# Patient Record
Sex: MALE | Race: OTHER | HISPANIC OR LATINO
[De-identification: names, ages, dates, MRNs, and addresses within clinical notes are randomized per-mention and may not be internally consistent; named-entity substitution may affect disease eponyms.]

---

## 2017-08-16 ENCOUNTER — TRANSCRIPTION ENCOUNTER (OUTPATIENT)
Age: 22
End: 2017-08-16

## 2017-08-18 ENCOUNTER — TRANSCRIPTION ENCOUNTER (OUTPATIENT)
Age: 22
End: 2017-08-18

## 2017-09-17 ENCOUNTER — EMERGENCY (EMERGENCY)
Facility: HOSPITAL | Age: 22
LOS: 1 days | Discharge: LEFT BEFORE DISCHARGE | End: 2017-09-17
Attending: EMERGENCY MEDICINE

## 2017-09-17 VITALS
WEIGHT: 182.1 LBS | OXYGEN SATURATION: 99 % | HEIGHT: 67 IN | RESPIRATION RATE: 18 BRPM | SYSTOLIC BLOOD PRESSURE: 139 MMHG | TEMPERATURE: 98 F | HEART RATE: 96 BPM | DIASTOLIC BLOOD PRESSURE: 80 MMHG

## 2017-09-17 DIAGNOSIS — R10.9 UNSPECIFIED ABDOMINAL PAIN: ICD-10-CM

## 2017-09-17 DIAGNOSIS — Z53.21 PROCEDURE AND TREATMENT NOT CARRIED OUT DUE TO PATIENT LEAVING PRIOR TO BEING SEEN BY HEALTH CARE PROVIDER: ICD-10-CM

## 2017-12-26 ENCOUNTER — TRANSCRIPTION ENCOUNTER (OUTPATIENT)
Age: 22
End: 2017-12-26

## 2017-12-29 ENCOUNTER — EMERGENCY (EMERGENCY)
Facility: HOSPITAL | Age: 22
LOS: 1 days | Discharge: HOME | End: 2017-12-29
Attending: EMERGENCY MEDICINE
Payer: SELF-PAY

## 2017-12-29 VITALS
DIASTOLIC BLOOD PRESSURE: 60 MMHG | OXYGEN SATURATION: 100 % | HEART RATE: 60 BPM | RESPIRATION RATE: 16 BRPM | TEMPERATURE: 97 F | SYSTOLIC BLOOD PRESSURE: 125 MMHG

## 2017-12-29 VITALS
HEART RATE: 60 BPM | SYSTOLIC BLOOD PRESSURE: 120 MMHG | OXYGEN SATURATION: 97 % | DIASTOLIC BLOOD PRESSURE: 67 MMHG | TEMPERATURE: 98 F | HEIGHT: 68 IN | RESPIRATION RATE: 16 BRPM | WEIGHT: 179.9 LBS

## 2017-12-29 PROCEDURE — 99283 EMERGENCY DEPT VISIT LOW MDM: CPT

## 2017-12-29 NOTE — ED ADULT TRIAGE NOTE - CHIEF COMPLAINT QUOTE
C/o " rash to stomach, chest, arms x 2 month. I went to the doctor and he said it was ringworm". UTD with childhood vaccines

## 2017-12-29 NOTE — ED PROVIDER NOTE - ATTENDING CONTRIBUTION TO CARE
23 y/o M presents with c/o worsening scaly rash to back, abd, and forearms x 2 weeks despite taking cream rx'd for fungal infection by walk in clinic x 4days. PE reveals diffuse circular scales on anterior and posterior trunk. No evidence of cellulitis. Tx with clotrimazole and f/u with derm as outpt.

## 2017-12-29 NOTE — ED PROVIDER NOTE - OBJECTIVE STATEMENT
21 y/o M pt with no significant PMHx presents to ED c/o worsening scaly rash to back, abd, and forearms x 2 weeks; rash on L arm x 2 months. Pt reports that he was seen by a walk-in clinic 4 days ago and was prescribed cream for fungal infection. Pt states that his rash has since spread. Pt denies taking any prednisone or steroids for symptom. 21 y/o M pt with no significant PMHx presents to ED c/o worsening scaly rash to back, abd, and forearms x 2 weeks; rash on L arm x 2 months. Pt reports that he was seen by a walk-in clinic 4 days ago and was prescribed cream for fungal infection. Pt states that his rash has since spread. Pt denies taking any prednisone or steroids for symptom. Pt denies h/o psoriasis, fever, chills, rash to groin, or any other complaints. NKDA.

## 2017-12-29 NOTE — ED PROVIDER NOTE - MEDICAL DECISION MAKING DETAILS
Pt is well appearing and vss c/o worsening diffused rash; symptoms suggestive of corporis. Pt comfirms going to gym, stating that he does not always shower and dry off afterwards. Will Rx body wash and instruct pt to always shower and dry off. Will instruct pt to f/u with dermatologist. Pt denies itchiness; no sign of infection present. Pt is well appearing and vss c/o worsening diffused rash; symptoms suggestive of tinea corporis. Pt confirms going to gym, stating that he does not always shower and dry off afterwards. Will Rx body wash and instruct pt to always shower and dry off. No signs infection. Will instruct pt to f/u with dermatologist. Pt denies itchiness; no sign of infection present.

## 2017-12-29 NOTE — ED PROVIDER NOTE - SKIN, MLM
diffused scales, circular scaly lesions on back and abd, no erythema, no induration, no fluctuance, no streaking, no heat.

## 2018-05-11 ENCOUNTER — EMERGENCY (EMERGENCY)
Facility: HOSPITAL | Age: 23
LOS: 1 days | Discharge: HOME | End: 2018-05-11
Attending: EMERGENCY MEDICINE
Payer: SELF-PAY

## 2018-05-11 VITALS
HEART RATE: 86 BPM | OXYGEN SATURATION: 99 % | DIASTOLIC BLOOD PRESSURE: 75 MMHG | TEMPERATURE: 99 F | SYSTOLIC BLOOD PRESSURE: 103 MMHG | RESPIRATION RATE: 17 BRPM | HEIGHT: 68 IN | WEIGHT: 184.97 LBS

## 2018-05-11 LAB
ACETONE SERPL-MCNC: NEGATIVE — SIGNIFICANT CHANGE UP
ALBUMIN SERPL ELPH-MCNC: 3.7 G/DL — SIGNIFICANT CHANGE UP (ref 3.5–5)
ALP SERPL-CCNC: 83 U/L — SIGNIFICANT CHANGE UP (ref 40–120)
ALT FLD-CCNC: 29 U/L DA — SIGNIFICANT CHANGE UP (ref 10–60)
ANION GAP SERPL CALC-SCNC: 7 MMOL/L — SIGNIFICANT CHANGE UP (ref 5–17)
APPEARANCE UR: CLEAR — SIGNIFICANT CHANGE UP
APTT BLD: 31.9 SEC — SIGNIFICANT CHANGE UP (ref 27.5–37.4)
AST SERPL-CCNC: 32 U/L — SIGNIFICANT CHANGE UP (ref 10–40)
BACTERIA # UR AUTO: ABNORMAL /HPF
BASOPHILS # BLD AUTO: 0 K/UL — SIGNIFICANT CHANGE UP (ref 0–0.2)
BASOPHILS NFR BLD AUTO: 1 % — SIGNIFICANT CHANGE UP (ref 0–2)
BILIRUB SERPL-MCNC: 0.5 MG/DL — SIGNIFICANT CHANGE UP (ref 0.2–1.2)
BILIRUB UR-MCNC: NEGATIVE — SIGNIFICANT CHANGE UP
BUN SERPL-MCNC: 15 MG/DL — SIGNIFICANT CHANGE UP (ref 7–18)
CALCIUM SERPL-MCNC: 8.7 MG/DL — SIGNIFICANT CHANGE UP (ref 8.4–10.5)
CHLORIDE SERPL-SCNC: 109 MMOL/L — HIGH (ref 96–108)
CO2 SERPL-SCNC: 26 MMOL/L — SIGNIFICANT CHANGE UP (ref 22–31)
COLOR SPEC: YELLOW — SIGNIFICANT CHANGE UP
CREAT SERPL-MCNC: 0.91 MG/DL — SIGNIFICANT CHANGE UP (ref 0.5–1.3)
DIFF PNL FLD: ABNORMAL
EOSINOPHIL # BLD AUTO: 0.1 K/UL — SIGNIFICANT CHANGE UP (ref 0–0.5)
EOSINOPHIL NFR BLD AUTO: 2.4 % — SIGNIFICANT CHANGE UP (ref 0–6)
ERYTHROCYTE [SEDIMENTATION RATE] IN BLOOD: 23 MM/HR — HIGH (ref 0–15)
GLUCOSE SERPL-MCNC: 121 MG/DL — HIGH (ref 70–99)
GLUCOSE UR QL: NEGATIVE — SIGNIFICANT CHANGE UP
HCT VFR BLD CALC: 46.8 % — SIGNIFICANT CHANGE UP (ref 39–50)
HGB BLD-MCNC: 15.8 G/DL — SIGNIFICANT CHANGE UP (ref 13–17)
INR BLD: 1.16 RATIO — SIGNIFICANT CHANGE UP (ref 0.88–1.16)
KETONES UR-MCNC: NEGATIVE — SIGNIFICANT CHANGE UP
LACTATE SERPL-SCNC: 1.2 MMOL/L — SIGNIFICANT CHANGE UP (ref 0.7–2)
LEUKOCYTE ESTERASE UR-ACNC: NEGATIVE — SIGNIFICANT CHANGE UP
LYMPHOCYTES # BLD AUTO: 1.2 K/UL — SIGNIFICANT CHANGE UP (ref 1–3.3)
LYMPHOCYTES # BLD AUTO: 28.5 % — SIGNIFICANT CHANGE UP (ref 13–44)
MAGNESIUM SERPL-MCNC: 1.9 MG/DL — SIGNIFICANT CHANGE UP (ref 1.6–2.6)
MCHC RBC-ENTMCNC: 30.6 PG — SIGNIFICANT CHANGE UP (ref 27–34)
MCHC RBC-ENTMCNC: 33.8 GM/DL — SIGNIFICANT CHANGE UP (ref 32–36)
MCV RBC AUTO: 90.5 FL — SIGNIFICANT CHANGE UP (ref 80–100)
MONOCYTES # BLD AUTO: 0.4 K/UL — SIGNIFICANT CHANGE UP (ref 0–0.9)
MONOCYTES NFR BLD AUTO: 10.1 % — SIGNIFICANT CHANGE UP (ref 2–14)
NEUTROPHILS # BLD AUTO: 2.3 K/UL — SIGNIFICANT CHANGE UP (ref 1.8–7.4)
NEUTROPHILS NFR BLD AUTO: 58 % — SIGNIFICANT CHANGE UP (ref 43–77)
NITRITE UR-MCNC: NEGATIVE — SIGNIFICANT CHANGE UP
PH UR: 5 — SIGNIFICANT CHANGE UP (ref 5–8)
PLATELET # BLD AUTO: 152 K/UL — SIGNIFICANT CHANGE UP (ref 150–400)
POTASSIUM SERPL-MCNC: 3.4 MMOL/L — LOW (ref 3.5–5.3)
POTASSIUM SERPL-SCNC: 3.4 MMOL/L — LOW (ref 3.5–5.3)
PROT SERPL-MCNC: 7.7 G/DL — SIGNIFICANT CHANGE UP (ref 6–8.3)
PROT UR-MCNC: NEGATIVE — SIGNIFICANT CHANGE UP
PROTHROM AB SERPL-ACNC: 12.7 SEC — SIGNIFICANT CHANGE UP (ref 9.8–12.7)
RAPID RVP RESULT: SIGNIFICANT CHANGE UP
RBC # BLD: 5.17 M/UL — SIGNIFICANT CHANGE UP (ref 4.2–5.8)
RBC # FLD: 10.6 % — SIGNIFICANT CHANGE UP (ref 10.3–14.5)
RBC CASTS # UR COMP ASSIST: SIGNIFICANT CHANGE UP /HPF (ref 0–2)
SODIUM SERPL-SCNC: 142 MMOL/L — SIGNIFICANT CHANGE UP (ref 135–145)
SP GR SPEC: 1.02 — SIGNIFICANT CHANGE UP (ref 1.01–1.02)
UROBILINOGEN FLD QL: 1
WBC # BLD: 4 K/UL — SIGNIFICANT CHANGE UP (ref 3.8–10.5)
WBC # FLD AUTO: 4 K/UL — SIGNIFICANT CHANGE UP (ref 3.8–10.5)
WBC UR QL: SIGNIFICANT CHANGE UP /HPF (ref 0–5)

## 2018-05-11 PROCEDURE — 71046 X-RAY EXAM CHEST 2 VIEWS: CPT

## 2018-05-11 PROCEDURE — 83605 ASSAY OF LACTIC ACID: CPT

## 2018-05-11 PROCEDURE — 87798 DETECT AGENT NOS DNA AMP: CPT

## 2018-05-11 PROCEDURE — 85652 RBC SED RATE AUTOMATED: CPT

## 2018-05-11 PROCEDURE — 87086 URINE CULTURE/COLONY COUNT: CPT

## 2018-05-11 PROCEDURE — 87040 BLOOD CULTURE FOR BACTERIA: CPT

## 2018-05-11 PROCEDURE — 87633 RESP VIRUS 12-25 TARGETS: CPT

## 2018-05-11 PROCEDURE — 87581 M.PNEUMON DNA AMP PROBE: CPT

## 2018-05-11 PROCEDURE — 83735 ASSAY OF MAGNESIUM: CPT

## 2018-05-11 PROCEDURE — 80053 COMPREHEN METABOLIC PANEL: CPT

## 2018-05-11 PROCEDURE — 87486 CHLMYD PNEUM DNA AMP PROBE: CPT

## 2018-05-11 PROCEDURE — 85027 COMPLETE CBC AUTOMATED: CPT

## 2018-05-11 PROCEDURE — 99284 EMERGENCY DEPT VISIT MOD MDM: CPT | Mod: 25

## 2018-05-11 PROCEDURE — 81001 URINALYSIS AUTO W/SCOPE: CPT

## 2018-05-11 PROCEDURE — 82009 KETONE BODYS QUAL: CPT

## 2018-05-11 PROCEDURE — 85730 THROMBOPLASTIN TIME PARTIAL: CPT

## 2018-05-11 PROCEDURE — 85610 PROTHROMBIN TIME: CPT

## 2018-05-11 PROCEDURE — 71046 X-RAY EXAM CHEST 2 VIEWS: CPT | Mod: 26

## 2018-05-11 PROCEDURE — 99284 EMERGENCY DEPT VISIT MOD MDM: CPT

## 2018-05-11 RX ORDER — SODIUM CHLORIDE 9 MG/ML
1000 INJECTION INTRAMUSCULAR; INTRAVENOUS; SUBCUTANEOUS ONCE
Qty: 0 | Refills: 0 | Status: DISCONTINUED | OUTPATIENT
Start: 2018-05-11 | End: 2018-05-15

## 2018-05-11 RX ORDER — SODIUM CHLORIDE 9 MG/ML
1000 INJECTION INTRAMUSCULAR; INTRAVENOUS; SUBCUTANEOUS
Qty: 0 | Refills: 0 | Status: DISCONTINUED | OUTPATIENT
Start: 2018-05-11 | End: 2018-05-15

## 2018-05-11 RX ORDER — POTASSIUM CHLORIDE 20 MEQ
40 PACKET (EA) ORAL ONCE
Qty: 0 | Refills: 0 | Status: DISCONTINUED | OUTPATIENT
Start: 2018-05-11 | End: 2018-05-15

## 2018-05-11 NOTE — ED PROVIDER NOTE - CONDUCTED A DETAILED DISCUSSION WITH PATIENT AND/OR GUARDIAN REGARDING, MDM
need for outpatient follow-up/lab results need for outpatient follow-up/radiology results/lab results

## 2018-05-11 NOTE — ED PROVIDER NOTE - PROGRESS NOTE DETAILS
Pt feeling much better, no leukocytosis, no source of infection, will d/c home, advised to f/u with PMD

## 2018-05-11 NOTE — ED PROVIDER NOTE - ENMT, MLM
Airway patent, Nasal mucosa clear. Mouth with normal mucosa. Throat has no vesicles, no oropharyngeal exudates and uvula is midline. sinus- NT to percussion

## 2018-05-11 NOTE — ED PROVIDER NOTE - MEDICAL DECISION MAKING DETAILS
21 y/o M pt w/ fever x7 days. No source of infection, mostly viral. Will get labs and RVP and reassess.

## 2018-05-11 NOTE — ED PROVIDER NOTE - OBJECTIVE STATEMENT
21 y/o M pt w/ no significant PMHx and no significant PSHx presents to ED c/o fever for x7 days w/ intermittent chills, body aches throughout entire body, HA at back of head, generalized  weakness, dizziness, and nausea. Pt states that he took Excedrin at 2 o'clock x5.5 hours ago w/ little relief. Pt admits to smoking marijuana very rarely. Pt denies smoking cigarettes, recent travel, or any recent contacts. Pt also denies cough, sneezing, dysuria, sore throat, vomiting, or any other complaints. NKDA. No PMD.

## 2018-05-12 ENCOUNTER — EMERGENCY (EMERGENCY)
Facility: HOSPITAL | Age: 23
LOS: 1 days | Discharge: HOME | End: 2018-05-12
Attending: EMERGENCY MEDICINE
Payer: SELF-PAY

## 2018-05-12 VITALS
HEART RATE: 96 BPM | DIASTOLIC BLOOD PRESSURE: 78 MMHG | SYSTOLIC BLOOD PRESSURE: 116 MMHG | OXYGEN SATURATION: 95 % | TEMPERATURE: 99 F | RESPIRATION RATE: 16 BRPM

## 2018-05-12 PROCEDURE — 99283 EMERGENCY DEPT VISIT LOW MDM: CPT

## 2018-05-12 PROCEDURE — 99284 EMERGENCY DEPT VISIT MOD MDM: CPT

## 2018-05-12 RX ORDER — ACETAMINOPHEN 500 MG
650 TABLET ORAL ONCE
Qty: 0 | Refills: 0 | Status: COMPLETED | OUTPATIENT
Start: 2018-05-12 | End: 2018-05-12

## 2018-05-12 RX ORDER — ONDANSETRON 8 MG/1
1 TABLET, FILM COATED ORAL
Qty: 28 | Refills: 0 | OUTPATIENT
Start: 2018-05-12 | End: 2018-05-18

## 2018-05-12 RX ORDER — ONDANSETRON 8 MG/1
8 TABLET, FILM COATED ORAL ONCE
Qty: 0 | Refills: 0 | Status: COMPLETED | OUTPATIENT
Start: 2018-05-12 | End: 2018-05-12

## 2018-05-12 RX ORDER — IBUPROFEN 200 MG
600 TABLET ORAL ONCE
Qty: 0 | Refills: 0 | Status: COMPLETED | OUTPATIENT
Start: 2018-05-12 | End: 2018-05-12
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
## 2018-05-12 NOTE — ED PROVIDER NOTE - CONDUCTED A DETAILED DISCUSSION WITH PATIENT AND/OR GUARDIAN REGARDING, MDM
need for outpatient follow-up lab results/radiology results/return to ED if symptoms worsen, persist or questions arise/need for outpatient follow-up

## 2018-05-12 NOTE — ED ADULT NURSE NOTE - OBJECTIVE STATEMENT
pt is a 23 y/o male c/o vomiting  /bodyaches states he has headache  and nausea since today reports was seen here  yesterday for the same  symptoms and was discharge home after feeling better.

## 2018-05-12 NOTE — ED ADULT NURSE NOTE - NSELOPED_ED_ALL_ED
Call was placed to patient's given phone number to arrange for patient to  instructions and/or prescription.

## 2018-05-12 NOTE — ED PROVIDER NOTE - OBJECTIVE STATEMENT
23 y/o M pt with no PMHx and no PSHx presents to ED c/o general (diffuse) body aches, HA, and nausea since today. Pt reports experiencing similar sx's yesterday; pt was seen in [facility] ED by Dr. Millan at the time and was diagnosed with a viral illness. Pt states undergoing a CXR, blood work, and RVP, all of which were normal, and pt was ultimately d/c home after feeling improved, however pt's sx's returned today. Pt denies abdominal pain, vomiting, diarrhea, difficulty urinating, or any other complaints. NKDA. 21 y/o M pt with no PMHx and no PSHx presents to ED c/o general (diffuse) body aches, HA, and nausea since today. Pt reports experiencing similar sx's yesterday; pt was seen in [facility] ED at the time and was diagnosed with a viral illness. Pt had CXR, blood work, and RVP, ua which were all of which were consistent with viral illness, and pt was ultimately d/c home after feeling improved, however pt's sx's returned today. Pt denies abdominal pain, vomiting, diarrhea, difficulty urinating, or any other complaints. NKDA.

## 2018-05-13 LAB
CULTURE RESULTS: NO GROWTH — SIGNIFICANT CHANGE UP
SPECIMEN SOURCE: SIGNIFICANT CHANGE UP

## 2018-05-17 LAB
CULTURE RESULTS: SIGNIFICANT CHANGE UP
CULTURE RESULTS: SIGNIFICANT CHANGE UP
SPECIMEN SOURCE: SIGNIFICANT CHANGE UP
SPECIMEN SOURCE: SIGNIFICANT CHANGE UP

## 2018-12-07 NOTE — ED PROVIDER NOTE - NS HIV RISK FACTOR YES
Patient scheduled for CPE with DR. Zheng on 12/20/18.   
Patient would like to have his yearly check up with Dr. Aceves. He knows it is time for labs. He usually has it done somewhere else. He knows Dr. Aceves is off until 01-17-19.  He is wondering if he could be seen by someone else until he comes back? Please call him back.  
Declined

## 2019-01-15 ENCOUNTER — EMERGENCY (EMERGENCY)
Facility: HOSPITAL | Age: 24
LOS: 1 days | Discharge: HOME | End: 2019-01-15
Attending: EMERGENCY MEDICINE
Payer: SELF-PAY

## 2019-01-15 VITALS
OXYGEN SATURATION: 98 % | TEMPERATURE: 98 F | SYSTOLIC BLOOD PRESSURE: 115 MMHG | RESPIRATION RATE: 70 BRPM | WEIGHT: 199.96 LBS | DIASTOLIC BLOOD PRESSURE: 71 MMHG | HEIGHT: 68 IN

## 2019-01-15 PROCEDURE — 99284 EMERGENCY DEPT VISIT MOD MDM: CPT

## 2019-01-15 PROCEDURE — 99053 MED SERV 10PM-8AM 24 HR FAC: CPT
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
## 2019-01-16 VITALS
DIASTOLIC BLOOD PRESSURE: 73 MMHG | TEMPERATURE: 98 F | RESPIRATION RATE: 16 BRPM | SYSTOLIC BLOOD PRESSURE: 119 MMHG | HEART RATE: 74 BPM | OXYGEN SATURATION: 99 %

## 2019-01-16 PROCEDURE — 99284 EMERGENCY DEPT VISIT MOD MDM: CPT | Mod: 25

## 2019-01-16 PROCEDURE — 70480 CT ORBIT/EAR/FOSSA W/O DYE: CPT

## 2019-01-16 PROCEDURE — 70480 CT ORBIT/EAR/FOSSA W/O DYE: CPT | Mod: 26

## 2019-01-16 RX ORDER — OXYCODONE AND ACETAMINOPHEN 5; 325 MG/1; MG/1
1 TABLET ORAL ONCE
Qty: 0 | Refills: 0 | Status: DISCONTINUED | OUTPATIENT
Start: 2019-01-16 | End: 2019-01-16

## 2019-01-16 RX ORDER — POLYMYXIN B SULF/TRIMETHOPRIM 10000-1/ML
1 DROPS OPHTHALMIC (EYE) ONCE
Qty: 0 | Refills: 0 | Status: COMPLETED | OUTPATIENT
Start: 2019-01-16 | End: 2019-01-16

## 2019-01-16 RX ORDER — IBUPROFEN 200 MG
1 TABLET ORAL
Qty: 20 | Refills: 0 | OUTPATIENT
Start: 2019-01-16 | End: 2019-01-20

## 2019-01-16 RX ADMIN — Medication 1 DROP(S): at 02:09

## 2019-01-16 RX ADMIN — OXYCODONE AND ACETAMINOPHEN 1 TABLET(S): 5; 325 TABLET ORAL at 01:45

## 2019-01-16 RX ADMIN — Medication 1 DROP(S): at 04:24

## 2019-01-16 NOTE — ED PROVIDER NOTE - NSFOLLOWUPINSTRUCTIONS_ED_ALL_ED_FT
Followup at Eye clinic today at 11am. Call [phone] for directions.  Followup with Ear/Nose/Throat specialist next week-Call Dr. Garcia above for appointment.    Apply 1 antibiotic drop every 3 hours for 7 days.  For pain continue medications as prescribed.

## 2019-01-16 NOTE — ED PROVIDER NOTE - CARE PROVIDER_API CALL
Rafy Garcia), Otolaryngology  [address] [place] [address]  Phone: [phone]  Fax: [phone]
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
## 2019-01-16 NOTE — ED PROVIDER NOTE - NSFOLLOWUPCLINICS_GEN_ALL_ED_FT
[facility]  Ophthalmology  [address] [place] [address] [address]  [place] [address]  Phone: [phone]  Fax:   Follow Up Time:

## 2019-01-16 NOTE — ED PROVIDER NOTE - PROGRESS NOTE DETAILS
CT shows L medial wall fracture  Spoke to Dr. Zhang CT shows L medial wall fracture  Spoke to Dr. Zhang from ophthalmology who reports patient can f/u in eye clinic today at 11am.  Discussed case with Dr. Garcia from ENT who recommends outpatient f/u next week.   Discussed above with patient, pt stable for discharge to f/u outpatient.

## 2019-01-16 NOTE — ED PROVIDER NOTE - PHYSICAL EXAMINATION
Left eye:   Mild redness over left super orbital and left infraorbital area  No stepoffs but tenderness to palpation  Mild conjunctival injection of the left eye Left eye:   VA L 20/20 R 20/13 uncorrected, OP L 11 OP R 16  flurescein exam shows small corneal abrasion approx 12oclock position  Mild redness over left super orbital and left infraorbital area  No stepoffs but tenderness to palpation in supraorbital and infraorbital areas  Mild conjunctival injection of the left eye
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
## 2019-03-08 ENCOUNTER — EMERGENCY (EMERGENCY)
Facility: HOSPITAL | Age: 24
LOS: 1 days | Discharge: LEFT BEFORE DISCHARGE | End: 2019-03-08

## 2019-03-08 VITALS
RESPIRATION RATE: 16 BRPM | TEMPERATURE: 98 F | OXYGEN SATURATION: 98 % | SYSTOLIC BLOOD PRESSURE: 124 MMHG | HEIGHT: 67 IN | DIASTOLIC BLOOD PRESSURE: 74 MMHG | WEIGHT: 199.96 LBS | HEART RATE: 78 BPM

## 2019-11-04 ENCOUNTER — EMERGENCY (EMERGENCY)
Facility: HOSPITAL | Age: 24
LOS: 1 days | Discharge: HOME | End: 2019-11-04
Attending: EMERGENCY MEDICINE
Payer: SELF-PAY

## 2019-11-04 VITALS
HEART RATE: 95 BPM | WEIGHT: 250 LBS | HEIGHT: 66 IN | RESPIRATION RATE: 20 BRPM | SYSTOLIC BLOOD PRESSURE: 112 MMHG | TEMPERATURE: 98 F | DIASTOLIC BLOOD PRESSURE: 80 MMHG | OXYGEN SATURATION: 97 %

## 2019-11-04 PROCEDURE — 99283 EMERGENCY DEPT VISIT LOW MDM: CPT

## 2019-11-04 PROCEDURE — 99282 EMERGENCY DEPT VISIT SF MDM: CPT
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
## 2019-11-04 NOTE — ED ADULT NURSE NOTE - NSIMPLEMENTINTERV_GEN_ALL_ED
Implemented All Universal Safety Interventions:  [place] to call system. Call bell, personal items and telephone within reach. Instruct patient to call for assistance. Room bathroom lighting operational. Non-slip footwear when patient is off stretcher. Physically safe environment: no spills, clutter or unnecessary equipment. Stretcher in lowest position, wheels locked, appropriate side rails in place.

## 2019-11-04 NOTE — ED PROVIDER NOTE - PHYSICAL EXAMINATION
echymosis to right maxilla. no bony tenderness to palpation. no hemotympanum. no malloclusion, normal bite. abrasion to forehead and left forearm.

## 2019-11-04 NOTE — ED PROVIDER NOTE - PATIENT PORTAL LINK FT
You can access the FollowMyHealth Patient Portal offered by [facility] by registering at the following website: [email or web address]. By joining [employer]’s FollowMyHealth portal, you will also be able to view your health information using other applications (apps) compatible with our system.
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
## 2020-02-25 ENCOUNTER — EMERGENCY (EMERGENCY)
Facility: HOSPITAL | Age: 25
LOS: 1 days | Discharge: HOME | End: 2020-02-25
Attending: EMERGENCY MEDICINE
Payer: MEDICAID

## 2020-02-25 VITALS
RESPIRATION RATE: 20 BRPM | HEIGHT: 68 IN | OXYGEN SATURATION: 96 % | SYSTOLIC BLOOD PRESSURE: 122 MMHG | HEART RATE: 100 BPM | DIASTOLIC BLOOD PRESSURE: 84 MMHG | WEIGHT: 214.95 LBS | TEMPERATURE: 98 F

## 2020-02-25 VITALS — HEART RATE: 86 BPM

## 2020-02-25 PROCEDURE — 99284 EMERGENCY DEPT VISIT MOD MDM: CPT

## 2020-02-25 PROCEDURE — 99283 EMERGENCY DEPT VISIT LOW MDM: CPT

## 2020-02-25 RX ORDER — METHOCARBAMOL 500 MG/1
1 TABLET, FILM COATED ORAL
Qty: 15 | Refills: 0
Start: 2020-02-25 | End: 2020-02-29

## 2020-02-25 RX ORDER — IBUPROFEN 200 MG
600 TABLET ORAL ONCE
Refills: 0 | Status: COMPLETED | OUTPATIENT
Start: 2020-02-25 | End: 2020-02-25

## 2020-02-25 RX ORDER — IBUPROFEN 200 MG
1 TABLET ORAL
Qty: 20 | Refills: 0
Start: 2020-02-25 | End: 2020-02-29

## 2020-02-25 RX ORDER — METHOCARBAMOL 500 MG/1
750 TABLET, FILM COATED ORAL ONCE
Refills: 0 | Status: COMPLETED | OUTPATIENT
Start: 2020-02-25 | End: 2020-02-25

## 2020-02-25 RX ADMIN — Medication 600 MILLIGRAM(S): at 20:55

## 2020-02-25 RX ADMIN — METHOCARBAMOL 750 MILLIGRAM(S): 500 TABLET, FILM COATED ORAL at 20:54

## 2020-02-25 NOTE — ED PROVIDER NOTE - ATTENDING CONTRIBUTION TO CARE
Agree with NP H&P.   23 y/o Male presents with lower back pain for x2 weeks. Patient denies any trauma.  Neurovascular intact. Will give analgesia and reassess.

## 2020-02-25 NOTE — ED PROVIDER NOTE - CLINICAL SUMMARY MEDICAL DECISION MAKING FREE TEXT BOX
Well-appearing, no focal neuro deficit. No radiculopathy. Will dc with Rx for meds, advised strongly to lose weight/follow up with PT. Well-appearing, no focal neuro deficit. No radiculopathy. Will dc with Rx for meds, advised strongly to lose weight/follow up with PT and PMD.

## 2020-02-25 NOTE — ED PROVIDER NOTE - PHYSICAL EXAMINATION
Back is symmetrical, scapulae are symmetric. Neck has full range of motion. No spinal or paraspinal tenderness, deformity or step-offs. All limbs equally strong 5+ bilaterally. Strong ankle dorsiflexion and plantar flexion against resistance bilaterally. Strong flexion/extension of big toe bilaterally. Pt is able to walk in straight line with steady gait. No recent weight loss or night sweats. No saddle anesthesia, no bowel/bladder incontinence or retention, no lower extremity weakness.
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
## 2020-02-25 NOTE — ED PROVIDER NOTE - NSFOLLOWUPINSTRUCTIONS_ED_ALL_ED_FT
Follow up with the primary care doctor within 1 week.  Warm compress to the back can help  Sitting with good posture  Physical therapy can help    If you experience any new or worsening symptoms or if you are concerned you can always come back to the emergency for a re-evaluation.    If there were any prescriptions given to you during the visit today take them as prescribed. If you have any questions you can ask the pharmacist.

## 2020-02-25 NOTE — ED PROVIDER NOTE - PATIENT PORTAL LINK FT
You can access the FollowMyHealth Patient Portal offered by [facility] by registering at the following website: [email or web address]. By joining [employer]’s FollowMyHealth portal, you will also be able to view your health information using other applications (apps) compatible with our system.
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
## 2020-03-05 ENCOUNTER — TRANSCRIPTION ENCOUNTER (OUTPATIENT)
Age: 25
End: 2020-03-05

## 2020-03-10 ENCOUNTER — TRANSCRIPTION ENCOUNTER (OUTPATIENT)
Age: 25
End: 2020-03-10

## 2020-03-30 ENCOUNTER — EMERGENCY (EMERGENCY)
Facility: HOSPITAL | Age: 25
LOS: 1 days | Discharge: HOME | End: 2020-03-30
Attending: EMERGENCY MEDICINE
Payer: MEDICAID

## 2020-03-30 VITALS
SYSTOLIC BLOOD PRESSURE: 102 MMHG | OXYGEN SATURATION: 99 % | DIASTOLIC BLOOD PRESSURE: 69 MMHG | WEIGHT: 220.02 LBS | HEIGHT: 68 IN | TEMPERATURE: 98 F | RESPIRATION RATE: 18 BRPM | HEART RATE: 78 BPM

## 2020-03-30 PROBLEM — Z78.9 OTHER SPECIFIED HEALTH STATUS: Chronic | Status: ACTIVE | Noted: 2020-02-25

## 2020-03-30 PROCEDURE — 87635 SARS-COV-2 COVID-19 AMP PRB: CPT

## 2020-03-30 PROCEDURE — 99283 EMERGENCY DEPT VISIT LOW MDM: CPT

## 2020-03-30 RX ORDER — ALBUTEROL 90 UG/1
2 AEROSOL, METERED ORAL
Qty: 1 | Refills: 0
Start: 2020-03-30 | End: 2020-04-28

## 2020-03-30 RX ORDER — AZITHROMYCIN 500 MG/1
1 TABLET, FILM COATED ORAL
Qty: 3 | Refills: 0
Start: 2020-03-30

## 2020-03-30 NOTE — ED PROVIDER NOTE - OBJECTIVE STATEMENT
24 y.o. male with c/o cough fever chills sx 4 days Tm, 104, was exposed to COVID through a coworker who tested positive, denies SOB, cough productive with yellow sputum, denies SOB.

## 2020-03-30 NOTE — ED PROVIDER NOTE - PHYSICAL EXAMINATION
VSS in NAD non toxic appearing   NCAT EOMI PERRL OP clear  heart RRR no murmur   lungs CTA no wheezing no rales no rhonchi

## 2020-03-30 NOTE — ED PROVIDER NOTE - NSFOLLOWUPINSTRUCTIONS_ED_ALL_ED_FT
You may have Coronavirus    COVID-19 testing are currently being prioritized at [facility] for admitted patients.     Patients that are stable for discharged from the ED will have COVID-19 testing performed within 7-10 days at which time most symptoms should improve.  Please follow instruction on provided COVID-19 discharge educational forms and self quarantine for 14 days.     In addition, you have been placed on our surveillance tracker. Return to the ED immediately if you have *shortness of breath*, fever, pain, weakness, vomiting any concerns.    1. STAY HOME for 14 DAYS,  Minimize Human contact to ONLY ESSENTIAL  2. Every time you wash your hands, sing the HAPPY BIRTHDAY Song so you know you're washing long enough.  Make sure to scrub the webspace between your fingers.  3. DRINK 1-2 Liters of Pedialyte or Sugarfree Gatorade per day x at least 5 days.  The more you drink, the more energy you will have.  Your fatigue, lightheadedness, and body aches will decrease and your fever has a better chance of breaking if you are well hydrated.    4. For your Fever and Body aches takes TYLENOL 650 mg every 6 hours   5. Use an inhaler for mild shortness of breath and cough.  6 AVOID MOTRIN ADVIL IBUPROFEN ALEEVE NAPROXEN  7. Buy a Pulse Oximeter to check your Oxygen  8. Stay away from anyone that is elderly, even if you live with them.  9. Even if you feel better, you must stay isolated for at least 3 days after your symptoms resolve without taking tylenol.    RETURN TO THE ER IMMEDIATELY IF YOU HAVE WORSENING SHORTNESS OF BREATH OR Oxygen < 93%    Consider Using [employer] NOW To get Pedialyte AND CAPSULE FOR Medicine to be delivered to your home.

## 2020-03-30 NOTE — ED PROVIDER NOTE - NS ED ROS FT
Denies SOB, CP, palpitations, wheezing, abdominal pain, N/V/D/C, change in urinary/bowel function, dysuria, hematuria, flank pain, malaise, rash, HA, and dizziness.  No recent travel.

## 2020-03-31 LAB — SARS-COV-2 RNA SPEC QL NAA+PROBE: DETECTED

## 2021-04-06 ENCOUNTER — TRANSCRIPTION ENCOUNTER (OUTPATIENT)
Age: 26
End: 2021-04-06

## 2022-07-08 NOTE — ED PROVIDER NOTE - CPE EDP NEURO NORM
KARLY following for HD needs. Patient has referral to [facility]. Awaiting Nephrology decision if tunneled cath will need to be placed. KARLY provided update to Gracie Sanabria at [facility]. Gabriel Yusuf spoke with patient's daughter Luther Narayan regarding HD referral.   Luther Narayan confirmed that family is looking at SNFs in [facility] [place] or [address] [place] [address]. KARLY discussed HD clinics near those locations. Luther Narayan would like to pursue Counts include 234 beds at the [facility] to be near SNF.  KARLY faxed referral. normal...
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
## 2024-11-25 NOTE — ED ADULT NURSE NOTE - DOES PATIENT HAVE ADVANCE DIRECTIVE
No protocol for requested medication.    Medication: HYDROcodone-acetaminophen (Norco) 5-325 MG per tablet   Last office visit date: 7/12/24  Opioid Refill Protocol - 12 Month Protocol - ALWAYS forward to ordering provider Mpuajm2011/25/2024 07:00 AM   Protocol Details Seen by prescribing provider or same department within the last 3 months or has a future appt in 3 months    FORWARD TO PROVIDER - Refill requests for these medications must ALWAYS be forwarded to the ordering provider, even if all criteria are met    PDMP has been reviewed in last 24 hours    Patient is not pregnant    Medication (including dose and sig) on current meds list    Urine/serum drug screen on file in the appropriate time frame according to Opioid Risk Tool (ORT) score    Active/up-to-date opioid agreement/consent on file    Naloxone on current med list and has other factors (see details for factors)   Pharmacy: [id number] - [place], [place] - [id number]    Order pended, routed to clinician for review.   
No

## 2025-01-03 NOTE — ED PROVIDER NOTE - NSCAREINITIATED _GEN_ER
Here for annual physical.  Order for blood work.  Check PSA.  Up to date on colon cancer screening.     For barretts - up to date on EGD>  Continue pantoprazole    For ED - recommend changing to sildenafil 100mg - use 1/2-1 tab as needed.      For anxiety - continue celexa.      Follow up in 6 months.    Fidelina Eastman(NP)
